# Patient Record
Sex: FEMALE | Race: WHITE | NOT HISPANIC OR LATINO | Employment: OTHER | URBAN - METROPOLITAN AREA
[De-identification: names, ages, dates, MRNs, and addresses within clinical notes are randomized per-mention and may not be internally consistent; named-entity substitution may affect disease eponyms.]

---

## 2017-02-14 ENCOUNTER — TRANSCRIBE ORDERS (OUTPATIENT)
Dept: ADMINISTRATIVE | Age: 52
End: 2017-02-14

## 2017-02-14 ENCOUNTER — APPOINTMENT (OUTPATIENT)
Dept: LAB | Age: 52
End: 2017-02-14
Payer: COMMERCIAL

## 2017-02-14 DIAGNOSIS — F31.30 BIPOLAR DISORDER, CURRENT EPISODE DEPRESSED, MILD OR MODERATE SEVERITY, UNSPECIFIED (HCC): ICD-10-CM

## 2017-02-14 DIAGNOSIS — G40.909 EPILEPSY WITHOUT STATUS EPILEPTICUS, NOT INTRACTABLE (HCC): ICD-10-CM

## 2017-02-14 DIAGNOSIS — R51.9 HEADACHE: ICD-10-CM

## 2017-02-14 LAB
ERYTHROCYTE [DISTWIDTH] IN BLOOD BY AUTOMATED COUNT: 14 % (ref 11.6–15.1)
HCT VFR BLD AUTO: 39.2 % (ref 34.8–46.1)
HGB BLD-MCNC: 12.7 G/DL (ref 11.5–15.4)
MCH RBC QN AUTO: 30.8 PG (ref 26.8–34.3)
MCHC RBC AUTO-ENTMCNC: 32.4 G/DL (ref 31.4–37.4)
MCV RBC AUTO: 95 FL (ref 82–98)
PLATELET # BLD AUTO: 238 THOUSANDS/UL (ref 149–390)
PMV BLD AUTO: 11 FL (ref 8.9–12.7)
RBC # BLD AUTO: 4.13 MILLION/UL (ref 3.81–5.12)
VALPROATE SERPL-MCNC: 78 UG/ML (ref 50–100)
WBC # BLD AUTO: 12.45 THOUSAND/UL (ref 4.31–10.16)

## 2017-02-14 PROCEDURE — 80164 ASSAY DIPROPYLACETIC ACD TOT: CPT

## 2017-02-14 PROCEDURE — 85027 COMPLETE CBC AUTOMATED: CPT

## 2017-02-14 PROCEDURE — 36415 COLL VENOUS BLD VENIPUNCTURE: CPT

## 2017-02-16 ENCOUNTER — ALLSCRIPTS OFFICE VISIT (OUTPATIENT)
Dept: OTHER | Facility: OTHER | Age: 52
End: 2017-02-16

## 2017-03-07 ENCOUNTER — GENERIC CONVERSION - ENCOUNTER (OUTPATIENT)
Dept: OTHER | Facility: OTHER | Age: 52
End: 2017-03-07

## 2017-08-17 ENCOUNTER — ALLSCRIPTS OFFICE VISIT (OUTPATIENT)
Dept: OTHER | Facility: OTHER | Age: 52
End: 2017-08-17

## 2017-08-29 ENCOUNTER — GENERIC CONVERSION - ENCOUNTER (OUTPATIENT)
Dept: OTHER | Facility: OTHER | Age: 52
End: 2017-08-29

## 2018-01-10 NOTE — PROGRESS NOTES
Recorded as Task  Date: 05/27/2016 12:05 PM, Created By: Nathaniel Crenshaw  Task Name: Call Patient with results  Assigned To: [facility]  Regarding Patient: Esdras Hamilton, Status: Active  CommentMayer Peed  - 27 May 2016 12:05 PM    Patient Phone: [phone]      Depakote level is quite a bit lower than in the recent past (25 on 5/26 versus 68-84)  Did she miss any doses during the 3 days prior to her lab draw  Has the pill changed (change  in generic )? Has she started any other new medications? If no clear reason, please have her repeat a total and free level in 2 weeks  Thanks  [address] - 27 May 2016 2:42 PM    TASK REPLIED TO: Previously Assigned To [facility]  Pt states that she did miss some doses 3 days prior to her lab draw because she forgot to take her meds to her boyfriends house, reports that she thinks she missed an evening and a morning dose  Meño Bronson - 66 Jun 2016 5:47 PM     TASK REASSIGNED: Previously Assigned To Guarnic  May want to have her get another level when you see her on 6/14  Electronically signed Luis Fernando ALMAGUER    Jun 6 2016  1:24PM EST Author

## 2018-01-12 NOTE — RESULT NOTES
Message   Depakote level is quite a bit lower than in the recent past (25 on 5/26 versus 68-84)  Did she miss any doses during the 3 days prior to her lab draw  Has the pill changed (change in generic )? Has she started any other new medications? If no clear reason, please have her repeat a total and free level in 2 weeks  Thanks        Verified Results  (1) HEPATIC FUNCTION PANEL [id number] 07:39AM Horris Laughter     Test Name Result Flag Reference   ALBUMIN 3 1 g/dL L 3 5-5 0   ALK PHOSPHATAS 162 U/L H    ALT (SGPT) 32 U/L  12-78   AST(SGOT) 15 U/L  5-45   BILI, DIRECT 0 09 mg/dL  0 00-0 20   BILI, TOTAL 0 23 mg/dL  0 20-1 00   TOTAL PROTEIN 6 9 g/dL  6 4-8 2     (1) AMYLASE [id number] 07:39AM Horris Laughter     Test Name Result Flag Reference   AMYLASE 20 IU/L L      (1) LIPASE [id number] 07:39AM Horris Laughter     Test Name Result Flag Reference   LIPASE 144 u/L       (1) VALPROIC ACID (DEPAKOTE) [id number] 07:39AM Horris Laughter     Test Name Result Flag Reference   VALPROIC ACID 25 ug/mL L

## 2018-01-13 VITALS
RESPIRATION RATE: 16 BRPM | SYSTOLIC BLOOD PRESSURE: 156 MMHG | BODY MASS INDEX: 36.82 KG/M2 | WEIGHT: 195 LBS | HEART RATE: 94 BPM | HEIGHT: 61 IN | DIASTOLIC BLOOD PRESSURE: 80 MMHG
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
## 2018-01-22 VITALS
BODY MASS INDEX: 36.19 KG/M2 | OXYGEN SATURATION: 90 % | DIASTOLIC BLOOD PRESSURE: 72 MMHG | SYSTOLIC BLOOD PRESSURE: 132 MMHG | HEART RATE: 82 BPM | WEIGHT: 191.56 LBS
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
## 2018-02-15 ENCOUNTER — OFFICE VISIT (OUTPATIENT)
Dept: NEUROLOGY | Facility: CLINIC | Age: 53
End: 2018-02-15
Payer: COMMERCIAL

## 2018-02-15 VITALS
SYSTOLIC BLOOD PRESSURE: 124 MMHG | BODY MASS INDEX: 34.04 KG/M2 | HEART RATE: 83 BPM | HEIGHT: 61 IN | WEIGHT: 180.3 LBS | DIASTOLIC BLOOD PRESSURE: 76 MMHG

## 2018-02-15 DIAGNOSIS — F31.9 BIPOLAR DISORDER WITH DEPRESSION (HCC): ICD-10-CM

## 2018-02-15 DIAGNOSIS — G40.909 EPILEPSY UNDETERMINED AS TO FOCAL OR GENERALIZED (HCC): Primary | ICD-10-CM

## 2018-02-15 DIAGNOSIS — G43.009 MIGRAINE WITHOUT AURA AND WITHOUT STATUS MIGRAINOSUS, NOT INTRACTABLE: ICD-10-CM

## 2018-02-15 PROBLEM — F29 PSYCHOSIS (HCC): Status: ACTIVE | Noted: 2017-11-08

## 2018-02-15 PROBLEM — I10 HTN (HYPERTENSION): Status: ACTIVE | Noted: 2017-11-09

## 2018-02-15 PROCEDURE — 99215 OFFICE O/P EST HI 40 MIN: CPT | Performed by: PSYCHIATRY & NEUROLOGY

## 2018-02-15 RX ORDER — HYDROXYZINE HYDROCHLORIDE 25 MG/1
TABLET, FILM COATED ORAL
COMMUNITY
Start: 2018-01-02 | End: 2018-02-15

## 2018-02-15 RX ORDER — DULOXETIN HYDROCHLORIDE 60 MG/1
1 CAPSULE, DELAYED RELEASE ORAL DAILY
COMMUNITY

## 2018-02-15 RX ORDER — IBUPROFEN 600 MG/1
1 TABLET ORAL
COMMUNITY

## 2018-02-15 RX ORDER — ALBUTEROL SULFATE 90 UG/1
2 AEROSOL, METERED RESPIRATORY (INHALATION) EVERY 4 HOURS PRN
COMMUNITY
Start: 2011-09-01

## 2018-02-15 RX ORDER — CETIRIZINE HYDROCHLORIDE 10 MG/1
TABLET ORAL
Refills: 0 | COMMUNITY
Start: 2017-12-26

## 2018-02-15 RX ORDER — DIVALPROEX SODIUM 500 MG/1
TABLET, EXTENDED RELEASE ORAL
Refills: 10 | COMMUNITY
Start: 2018-01-02 | End: 2018-02-15

## 2018-02-15 RX ORDER — ATORVASTATIN CALCIUM 40 MG/1
1 TABLET, FILM COATED ORAL DAILY
Refills: 0 | COMMUNITY
Start: 2018-01-09

## 2018-02-15 RX ORDER — MONTELUKAST SODIUM 10 MG/1
10 TABLET ORAL
COMMUNITY
Start: 2015-09-03 | End: 2018-02-15

## 2018-02-15 RX ORDER — FENOFIBRATE 160 MG/1
TABLET ORAL
Refills: 2 | COMMUNITY
Start: 2017-12-26

## 2018-02-15 RX ORDER — SUMATRIPTAN 100 MG/1
TABLET, FILM COATED ORAL
Qty: 9 TABLET | Refills: 11 | Status: SHIPPED | OUTPATIENT
Start: 2018-02-15 | End: 2018-10-19 | Stop reason: SDUPTHER

## 2018-02-15 RX ORDER — DIVALPROEX SODIUM 500 MG/1
TABLET, EXTENDED RELEASE ORAL
Qty: 270 TABLET | Refills: 3 | Status: SHIPPED | OUTPATIENT
Start: 2018-02-15 | End: 2018-10-19 | Stop reason: SDUPTHER

## 2018-02-15 RX ORDER — LATANOPROST 50 UG/ML
1 SOLUTION/ DROPS OPHTHALMIC
COMMUNITY
Start: 2015-09-03

## 2018-02-15 RX ORDER — LISINOPRIL 10 MG/1
1 TABLET ORAL DAILY
COMMUNITY
Start: 2017-02-16

## 2018-02-15 RX ORDER — BUDESONIDE AND FORMOTEROL FUMARATE DIHYDRATE 80; 4.5 UG/1; UG/1
AEROSOL RESPIRATORY (INHALATION)
COMMUNITY

## 2018-02-15 RX ORDER — CLONAZEPAM 1 MG/1
TABLET ORAL
Refills: 2 | COMMUNITY
Start: 2018-01-12 | End: 2019-06-03

## 2018-02-15 RX ORDER — PROPRANOLOL HYDROCHLORIDE 80 MG/1
CAPSULE, EXTENDED RELEASE ORAL
COMMUNITY
Start: 2016-02-24 | End: 2018-02-15 | Stop reason: SDUPTHER

## 2018-02-15 RX ORDER — SUMATRIPTAN 100 MG/1
TABLET, FILM COATED ORAL
COMMUNITY
Start: 2017-02-16 | End: 2018-02-15 | Stop reason: SDUPTHER

## 2018-02-15 RX ORDER — MAG HYDROX/ALUMINUM HYD/SIMETH 400-400-40
SUSPENSION, ORAL (FINAL DOSE FORM) ORAL
Refills: 2 | COMMUNITY
Start: 2017-12-26

## 2018-02-15 RX ORDER — MULTIVITAMIN
TABLET ORAL
COMMUNITY

## 2018-02-15 RX ORDER — FLUTICASONE PROPIONATE 50 MCG
1 SPRAY, SUSPENSION (ML) NASAL
COMMUNITY
Start: 2011-09-01

## 2018-02-15 RX ORDER — CANAGLIFLOZIN 100 MG/1
TABLET, FILM COATED ORAL
Refills: 2 | COMMUNITY
Start: 2018-01-12

## 2018-02-15 RX ORDER — CHOLECALCIFEROL (VITAMIN D3) 125 MCG
9000 CAPSULE ORAL
COMMUNITY

## 2018-02-15 RX ORDER — PROPRANOLOL HYDROCHLORIDE 80 MG/1
80 CAPSULE, EXTENDED RELEASE ORAL
Qty: 90 CAPSULE | Refills: 3 | Status: SHIPPED | OUTPATIENT
Start: 2018-02-15 | End: 2018-10-19 | Stop reason: SDUPTHER

## 2018-02-15 RX ORDER — TRAZODONE HYDROCHLORIDE 150 MG/1
150 TABLET ORAL
COMMUNITY
Start: 2018-01-02
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
## 2018-02-15 NOTE — PATIENT INSTRUCTIONS
1 - continue with Divalproex ER 500mg take one tab in AM and two tabs in PM  2 - continue with Propranolol LA 80mg at bedtime  3 - continue with Imitrex 100mg as needed for onset of migraine headaches  4 -    Check out website  [facility]  org/  This website has a list of certified epilepsy centers (centers for the management of seizure disorders)  You can search for a nearby epilepsy center  Management recommendations for migraines  Management of migraine headaches is to use a combination of preventative treatment, physical therapy, and abortive treatments  Prevention of headaches include adequate sleep (good sleep habits, same time every time), hydration (6-8 cups of water a day), and avoiding triggers (keep a headache calendar to recognize triggers (stress, odors, junk food, processed food)  Preventative treatment  -  Continue with Propranolol    Acute / Abortive treatment  -  Minimize the use of abortive therapy to 2 days per weeks, this would prevent medication overuse headache; do not rely only on one form of abortive therapy, use a headache calendar to determine what treatment works, alternate treatment strategy     -  You may continue with sumatriptan (Imitrex) 100mg as needed for migraine attacks  -  You want to incorporate nonpharmacological treatments for headaches such as cool packs to the forehead and temple for 20 minutes or heat packs to the neck and posterior head

## 2018-04-04 ENCOUNTER — TRANSCRIBE ORDERS (OUTPATIENT)
Dept: ADMINISTRATIVE | Facility: HOSPITAL | Age: 53
End: 2018-04-04

## 2018-04-04 DIAGNOSIS — I82.890 BLOOD CLOT IN ABDOMINAL VEIN: Primary | ICD-10-CM

## 2018-07-06 ENCOUNTER — OFFICE VISIT (OUTPATIENT)
Dept: AUDIOLOGY | Age: 53
End: 2018-07-06

## 2018-07-06 DIAGNOSIS — H90.3 SENSORINEURAL HEARING LOSS, BILATERAL: Primary | ICD-10-CM

## 2018-07-06 NOTE — PROGRESS NOTES
Hearing Aid Visit:    Name:  Ayan Butts  :  1965  Age:  48 y o  Date of Evaluation: 18     Ayan Butts is being seen for a hearing aid visit  Ayan Butts   is fit binaurally with [employer] V-[id number] 312 hearing aid(s) serial number [id number] right, warranty 18/ serial number [id number] left, warranty 18  Patient reports hearing aids are not working  Action:  Cleaned and checked  Filters were completely clogged  Replaced filters and sport locks  Counseled patient on care and cleaning, reviewed changing filters in office  Right  was broken, replaced with stock  Recommendations: Follow up PRN          Sagrario Thomas , CCC-A  Clinical Audiologist

## 2018-09-07 ENCOUNTER — TELEPHONE (OUTPATIENT)
Dept: NEUROLOGY | Facility: CLINIC | Age: 53
End: 2018-09-07

## 2018-10-18 NOTE — PROGRESS NOTES
Patient ID: Pete Smith is a 48 y o  female  Assessment/Plan:    No problem-specific Assessment & Plan notes found for this encounter  {Assess/PlanSmartLinks:77397}       Subjective:    HPI    {[facility] HPI texts:89908}    {Common ambulatory SmartLinks:30033}         Objective: There were no vitals taken for this visit      Physical Exam    Neurological Exam      ROS:    Review of Systems

## 2018-10-19 ENCOUNTER — OFFICE VISIT (OUTPATIENT)
Dept: NEUROLOGY | Facility: CLINIC | Age: 53
End: 2018-10-19
Payer: COMMERCIAL

## 2018-10-19 VITALS
DIASTOLIC BLOOD PRESSURE: 76 MMHG | HEART RATE: 76 BPM | WEIGHT: 178 LBS | BODY MASS INDEX: 33.61 KG/M2 | SYSTOLIC BLOOD PRESSURE: 122 MMHG | HEIGHT: 61 IN

## 2018-10-19 DIAGNOSIS — G43.009 MIGRAINE WITHOUT AURA AND WITHOUT STATUS MIGRAINOSUS, NOT INTRACTABLE: ICD-10-CM

## 2018-10-19 DIAGNOSIS — G40.909 EPILEPSY UNDETERMINED AS TO FOCAL OR GENERALIZED (HCC): Primary | ICD-10-CM

## 2018-10-19 PROCEDURE — 99214 OFFICE O/P EST MOD 30 MIN: CPT | Performed by: NURSE PRACTITIONER

## 2018-10-19 RX ORDER — SUMATRIPTAN 100 MG/1
TABLET, FILM COATED ORAL
Qty: 9 TABLET | Refills: 0 | Status: SHIPPED | OUTPATIENT
Start: 2018-10-19 | End: 2019-06-03 | Stop reason: SDUPTHER

## 2018-10-19 RX ORDER — PROPRANOLOL HYDROCHLORIDE 80 MG/1
80 CAPSULE, EXTENDED RELEASE ORAL
Qty: 90 CAPSULE | Refills: 3 | Status: SHIPPED | OUTPATIENT
Start: 2018-10-19 | End: 2019-06-03 | Stop reason: SDUPTHER

## 2018-10-19 RX ORDER — DIVALPROEX SODIUM 500 MG/1
TABLET, EXTENDED RELEASE ORAL
Qty: 270 TABLET | Refills: 3 | Status: SHIPPED | OUTPATIENT
Start: 2018-10-19 | End: 2019-06-03 | Stop reason: SDUPTHER
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
## 2018-10-19 NOTE — ASSESSMENT & PLAN NOTE
Overall good control of her migraines with use of propranolol 80mg daily as a preventative and imitrex as needed for an abortive medication  We discussed that imitrex should not be used more then 2-3 times per week

## 2018-10-19 NOTE — PATIENT INSTRUCTIONS
1 - continue with Divalproex ER 500mg take one tab in AM and two tabs in PM  2 - continue with Propranolol LA 80mg at bedtime  3 - continue with Imitrex 100mg as needed for onset of migraine headaches  4- get labs checked about 1-2 weeks prior to your next visit with Dr Jaime Davis  5- call with any new or suspected seizures

## 2018-10-19 NOTE — ASSESSMENT & PLAN NOTE
Renato Feng remains seizure free on depakote monotherapy  She is not experiencing any adverse effects  I will have her remain on her current regimen of depakote ER 500mg in the am and 1000mg at bedtime  She will have labs checked (VPA level, CBC, CMP, and vitamin D level) prior to her next visit with Dr Karma Aponte  She was encouraged to call with any new or suspected seizures

## 2018-12-10 ENCOUNTER — TELEPHONE (OUTPATIENT)
Dept: NEUROLOGY | Facility: CLINIC | Age: 53
End: 2018-12-10

## 2019-04-21 ENCOUNTER — HOSPITAL ENCOUNTER (EMERGENCY)
Facility: HOSPITAL | Age: 54
Discharge: HOME | End: 2019-04-21
Attending: EMERGENCY MEDICINE | Admitting: EMERGENCY MEDICINE
Payer: COMMERCIAL

## 2019-04-21 ENCOUNTER — APPOINTMENT (EMERGENCY)
Dept: CT IMAGING | Facility: HOSPITAL | Age: 54
End: 2019-04-21
Payer: COMMERCIAL

## 2019-04-21 ENCOUNTER — APPOINTMENT (EMERGENCY)
Dept: RADIOLOGY | Facility: HOSPITAL | Age: 54
End: 2019-04-21
Payer: COMMERCIAL

## 2019-04-21 VITALS
WEIGHT: 183.42 LBS | DIASTOLIC BLOOD PRESSURE: 69 MMHG | OXYGEN SATURATION: 94 % | SYSTOLIC BLOOD PRESSURE: 152 MMHG | BODY MASS INDEX: 34.66 KG/M2 | HEART RATE: 65 BPM | RESPIRATION RATE: 20 BRPM | TEMPERATURE: 98.1 F

## 2019-04-21 DIAGNOSIS — R42 VERTIGO: Primary | ICD-10-CM

## 2019-04-21 LAB
ALBUMIN SERPL BCP-MCNC: 3.2 G/DL (ref 3.5–5)
ALP SERPL-CCNC: 104 U/L (ref 46–116)
ALT SERPL W P-5'-P-CCNC: 32 U/L (ref 12–78)
ANION GAP SERPL CALCULATED.3IONS-SCNC: 10 MMOL/L (ref 4–13)
APTT PPP: 31 SECONDS (ref 26–38)
AST SERPL W P-5'-P-CCNC: 16 U/L (ref 5–45)
BASOPHILS # BLD AUTO: 0.05 THOUSANDS/ΜL (ref 0–0.1)
BASOPHILS NFR BLD AUTO: 1 % (ref 0–1)
BILIRUB SERPL-MCNC: 0.2 MG/DL (ref 0.2–1)
BUN SERPL-MCNC: 23 MG/DL (ref 5–25)
CALCIUM SERPL-MCNC: 9.6 MG/DL (ref 8.3–10.1)
CHLORIDE SERPL-SCNC: 101 MMOL/L (ref 100–108)
CO2 SERPL-SCNC: 28 MMOL/L (ref 21–32)
CREAT SERPL-MCNC: 0.61 MG/DL (ref 0.6–1.3)
EOSINOPHIL # BLD AUTO: 0.2 THOUSAND/ΜL (ref 0–0.61)
EOSINOPHIL NFR BLD AUTO: 2 % (ref 0–6)
ERYTHROCYTE [DISTWIDTH] IN BLOOD BY AUTOMATED COUNT: 14.5 % (ref 11.6–15.1)
GFR SERPL CREATININE-BSD FRML MDRD: 103 ML/MIN/1.73SQ M
GLUCOSE SERPL-MCNC: 162 MG/DL (ref 65–140)
HCT VFR BLD AUTO: 41.5 % (ref 34.8–46.1)
HGB BLD-MCNC: 13.1 G/DL (ref 11.5–15.4)
IMM GRANULOCYTES # BLD AUTO: 0.08 THOUSAND/UL (ref 0–0.2)
IMM GRANULOCYTES NFR BLD AUTO: 1 % (ref 0–2)
INR PPP: 0.99 (ref 0.86–1.17)
LYMPHOCYTES # BLD AUTO: 3.26 THOUSANDS/ΜL (ref 0.6–4.47)
LYMPHOCYTES NFR BLD AUTO: 30 % (ref 14–44)
MAGNESIUM SERPL-MCNC: 1.7 MG/DL (ref 1.6–2.6)
MCH RBC QN AUTO: 30.2 PG (ref 26.8–34.3)
MCHC RBC AUTO-ENTMCNC: 31.6 G/DL (ref 31.4–37.4)
MCV RBC AUTO: 96 FL (ref 82–98)
MONOCYTES # BLD AUTO: 0.69 THOUSAND/ΜL (ref 0.17–1.22)
MONOCYTES NFR BLD AUTO: 6 % (ref 4–12)
NEUTROPHILS # BLD AUTO: 6.76 THOUSANDS/ΜL (ref 1.85–7.62)
NEUTS SEG NFR BLD AUTO: 60 % (ref 43–75)
NRBC BLD AUTO-RTO: 0 /100 WBCS
PLATELET # BLD AUTO: 237 THOUSANDS/UL (ref 149–390)
PMV BLD AUTO: 10.7 FL (ref 8.9–12.7)
POTASSIUM SERPL-SCNC: 4.5 MMOL/L (ref 3.5–5.3)
PROT SERPL-MCNC: 7.5 G/DL (ref 6.4–8.2)
PROTHROMBIN TIME: 12.6 SECONDS (ref 11.8–14.2)
RBC # BLD AUTO: 4.34 MILLION/UL (ref 3.81–5.12)
SODIUM SERPL-SCNC: 139 MMOL/L (ref 136–145)
TROPONIN I SERPL-MCNC: <0.02 NG/ML
TSH SERPL DL<=0.05 MIU/L-ACNC: 2.45 UIU/ML (ref 0.36–3.74)
WBC # BLD AUTO: 11.04 THOUSAND/UL (ref 4.31–10.16)

## 2019-04-21 PROCEDURE — 85730 THROMBOPLASTIN TIME PARTIAL: CPT | Performed by: EMERGENCY MEDICINE

## 2019-04-21 PROCEDURE — 99284 EMERGENCY DEPT VISIT MOD MDM: CPT | Performed by: EMERGENCY MEDICINE

## 2019-04-21 PROCEDURE — 93005 ELECTROCARDIOGRAM TRACING: CPT

## 2019-04-21 PROCEDURE — 80053 COMPREHEN METABOLIC PANEL: CPT | Performed by: EMERGENCY MEDICINE

## 2019-04-21 PROCEDURE — 83735 ASSAY OF MAGNESIUM: CPT | Performed by: EMERGENCY MEDICINE

## 2019-04-21 PROCEDURE — 70496 CT ANGIOGRAPHY HEAD: CPT

## 2019-04-21 PROCEDURE — 84443 ASSAY THYROID STIM HORMONE: CPT | Performed by: EMERGENCY MEDICINE

## 2019-04-21 PROCEDURE — 71046 X-RAY EXAM CHEST 2 VIEWS: CPT

## 2019-04-21 PROCEDURE — 99285 EMERGENCY DEPT VISIT HI MDM: CPT

## 2019-04-21 PROCEDURE — 70498 CT ANGIOGRAPHY NECK: CPT

## 2019-04-21 PROCEDURE — 96360 HYDRATION IV INFUSION INIT: CPT

## 2019-04-21 PROCEDURE — 85610 PROTHROMBIN TIME: CPT | Performed by: EMERGENCY MEDICINE

## 2019-04-21 PROCEDURE — 84484 ASSAY OF TROPONIN QUANT: CPT | Performed by: EMERGENCY MEDICINE

## 2019-04-21 PROCEDURE — 85025 COMPLETE CBC W/AUTO DIFF WBC: CPT | Performed by: EMERGENCY MEDICINE

## 2019-04-21 RX ADMIN — IOHEXOL 100 ML: 350 INJECTION, SOLUTION INTRAVENOUS at 11:52

## 2019-04-21 RX ADMIN — SODIUM CHLORIDE 1000 ML: 0.9 INJECTION, SOLUTION INTRAVENOUS at 10:52

## 2019-04-22 LAB
ATRIAL RATE: 69 BPM
P AXIS: 42 DEGREES
PR INTERVAL: 178 MS
QRS AXIS: 46 DEGREES
QRSD INTERVAL: 74 MS
QT INTERVAL: 426 MS
QTC INTERVAL: 456 MS
T WAVE AXIS: 23 DEGREES
VENTRICULAR RATE: 69 BPM

## 2019-04-22 PROCEDURE — 93010 ELECTROCARDIOGRAM REPORT: CPT | Performed by: INTERNAL MEDICINE

## 2019-06-03 ENCOUNTER — OFFICE VISIT (OUTPATIENT)
Dept: NEUROLOGY | Facility: CLINIC | Age: 54
End: 2019-06-03
Payer: COMMERCIAL

## 2019-06-03 ENCOUNTER — TELEPHONE (OUTPATIENT)
Dept: NEUROLOGY | Facility: CLINIC | Age: 54
End: 2019-06-03

## 2019-06-03 VITALS
DIASTOLIC BLOOD PRESSURE: 68 MMHG | HEIGHT: 61 IN | BODY MASS INDEX: 33.99 KG/M2 | HEART RATE: 67 BPM | WEIGHT: 180 LBS | SYSTOLIC BLOOD PRESSURE: 106 MMHG

## 2019-06-03 DIAGNOSIS — F31.9 BIPOLAR DISORDER WITH DEPRESSION (HCC): ICD-10-CM

## 2019-06-03 DIAGNOSIS — G43.009 MIGRAINE WITHOUT AURA AND WITHOUT STATUS MIGRAINOSUS, NOT INTRACTABLE: ICD-10-CM

## 2019-06-03 DIAGNOSIS — G40.909 EPILEPSY UNDETERMINED AS TO FOCAL OR GENERALIZED (HCC): Primary | ICD-10-CM

## 2019-06-03 DIAGNOSIS — G31.84 MILD NEUROCOGNITIVE DISORDER: ICD-10-CM

## 2019-06-03 PROCEDURE — 99215 OFFICE O/P EST HI 40 MIN: CPT | Performed by: PSYCHIATRY & NEUROLOGY

## 2019-06-03 RX ORDER — PROPRANOLOL HYDROCHLORIDE 80 MG/1
80 CAPSULE, EXTENDED RELEASE ORAL
Qty: 90 CAPSULE | Refills: 5 | Status: SHIPPED | OUTPATIENT
Start: 2019-06-03

## 2019-06-03 RX ORDER — DIVALPROEX SODIUM 500 MG/1
TABLET, EXTENDED RELEASE ORAL
Qty: 270 TABLET | Refills: 3 | Status: SHIPPED | OUTPATIENT
Start: 2019-06-03

## 2019-06-03 RX ORDER — SUMATRIPTAN 100 MG/1
TABLET, FILM COATED ORAL
Qty: 9 TABLET | Refills: 5 | Status: SHIPPED | OUTPATIENT
Start: 2019-06-03

## 2019-07-12 LAB
ALBUMIN SERPL ELPH-MCNC: 3.8 G/DL (ref 3.8–4.8)
ALPHA1 GLOB SERPL ELPH-MCNC: 0.3 G/DL (ref 0.2–0.3)
ALPHA2 GLOB SERPL ELPH-MCNC: 0.7 G/DL (ref 0.5–0.9)
BETA1 GLOB SERPL ELPH-MCNC: 0.6 G/DL (ref 0.4–0.6)
BETA2 GLOB SERPL ELPH-MCNC: 0.4 G/DL (ref 0.2–0.5)
GAMMA GLOB SERPL ELPH-MCNC: 1 G/DL (ref 0.8–1.7)
HCYS SERPL-SCNC: 8.8 UMOL/L
METHYLMALONATE SERPL-SCNC: 64 NMOL/L (ref 87–318)
PROT PATTERN SERPL ELPH-IMP: NORMAL
PROT SERPL-MCNC: 6.8 G/DL (ref 6.1–8.1)
RPR SER QL: NORMAL
VALPROATE FREE SERPL-MCNC: 16.8 MG/L (ref 4.8–17.3)
VIT B1 BLD-SCNC: 183 NMOL/L (ref 78–185)
VIT B12 SERPL-MCNC: 846 PG/ML (ref 200–1100)
VIT B6 SERPL-MCNC: 4.9 NG/ML (ref 2.1–21.7)

## 2019-07-18 ENCOUNTER — OFFICE VISIT (OUTPATIENT)
Dept: AUDIOLOGY | Age: 54
End: 2019-07-18

## 2019-07-18 DIAGNOSIS — H90.3 SENSORY HEARING LOSS, BILATERAL: Primary | ICD-10-CM

## 2019-07-18 NOTE — PROGRESS NOTES
Hearing Aid Visit:    Name:  Giancarlo Hdez  :  1965  Age:  47 y o  Date of Evaluation: 19     Giancarlo Hdez is being seen for a hearing aid visit  Patient is fit with [employer] 28 V-70 312 hearing aid(s)  Right serial number [id number]  Left serial number [id number]  Warranty date: Right 18 / Left 18 (Loss/Damage and repair)  Patient reports the hearing aids are not working correctly  They sound weak  Action:  Cleaned and checked hearing aids  Both wax filters completely clogged  Replaced both, and counseled patient on changing  Demonstrated in the office  Increased the overall gain of the left hearing aid to balance them  Reviewed care and cleaning, and the need to wear the hearing aids consistently  * Hearing aids are out of warranty, today's appointment was done as a courtesy, discussed with patient that the next visit will be $30      Recommendations:   New hearing test    Follow up 6 months or PRN for hearing aids        Sagrario Tanner , [facility]  Clinical Audiologist

## 2019-08-08 ENCOUNTER — TELEPHONE (OUTPATIENT)
Dept: NEUROLOGY | Facility: CLINIC | Age: 54
End: 2019-08-08

## 2019-08-08 NOTE — TELEPHONE ENCOUNTER
pt called and states that she stopped propranolol after last office visit  she started getting headaches again so she restarted propranolol the beginning of july  she feels that headaches are better since restarting    she is currently taking 80mg 1 tab   [phone]

## 2019-11-29 ENCOUNTER — TELEPHONE (OUTPATIENT)
Dept: NEUROLOGY | Facility: CLINIC | Age: 54
End: 2019-11-29

## 2020-02-06 ENCOUNTER — TELEPHONE (OUTPATIENT)
Dept: NEUROLOGY | Facility: CLINIC | Age: 55
End: 2020-02-06

## 2020-02-06 NOTE — TELEPHONE ENCOUNTER
Received Fax from [facility] on 2/6/2020 requesting Medical Records with signed [facility]  Scanned document into chart and faxed to [id number] on 2/6/2020  PT mailing address:  [address] [place] [address] [id number]    [facility] for PT with call back number [phone] asking if she would like her address changed to [place] address listed on [facility]

## 2020-04-03 DIAGNOSIS — G43.009 MIGRAINE WITHOUT AURA AND WITHOUT STATUS MIGRAINOSUS, NOT INTRACTABLE: ICD-10-CM

## 2020-04-03 RX ORDER — SUMATRIPTAN 100 MG/1
TABLET, FILM COATED ORAL
Qty: 9 TABLET | Refills: 0 | OUTPATIENT
Start: 2020-04-03

## 2020-07-14 ENCOUNTER — DOCUMENTATION (OUTPATIENT)
Dept: AUDIOLOGY | Age: 55
End: 2020-07-14

## 2020-07-14 NOTE — PROGRESS NOTES
Progress Note    Name:  Shashank Riggins  :  1965  Age:  54 y o    Date of Evaluation: 20     Scanned documents       Sagrario Rice   Clinical Audiologist

## 2021-05-10 NOTE — PROGRESS NOTES
Review of Systems    A review of at least 12 organ/systems was obtained by the medical assistant and reviewed by me, including additional positives/negatives:    Review of Systems   All other systems reviewed and are negative  Yes